# Patient Record
Sex: FEMALE | Race: WHITE | Employment: FULL TIME | URBAN - METROPOLITAN AREA
[De-identification: names, ages, dates, MRNs, and addresses within clinical notes are randomized per-mention and may not be internally consistent; named-entity substitution may affect disease eponyms.]

---

## 2017-11-22 ENCOUNTER — LAB ENCOUNTER (OUTPATIENT)
Dept: LAB | Age: 22
End: 2017-11-22
Attending: PHYSICIAN ASSISTANT
Payer: COMMERCIAL

## 2017-11-22 ENCOUNTER — OFFICE VISIT (OUTPATIENT)
Dept: INTERNAL MEDICINE CLINIC | Facility: CLINIC | Age: 22
End: 2017-11-22

## 2017-11-22 VITALS
OXYGEN SATURATION: 99 % | RESPIRATION RATE: 18 BRPM | DIASTOLIC BLOOD PRESSURE: 60 MMHG | WEIGHT: 137.75 LBS | TEMPERATURE: 99 F | BODY MASS INDEX: 23.23 KG/M2 | HEIGHT: 64.5 IN | SYSTOLIC BLOOD PRESSURE: 98 MMHG | HEART RATE: 98 BPM

## 2017-11-22 DIAGNOSIS — Z11.3 SCREEN FOR STD (SEXUALLY TRANSMITTED DISEASE): ICD-10-CM

## 2017-11-22 DIAGNOSIS — Z00.00 ANNUAL PHYSICAL EXAM: Primary | ICD-10-CM

## 2017-11-22 DIAGNOSIS — Z00.00 ANNUAL PHYSICAL EXAM: ICD-10-CM

## 2017-11-22 DIAGNOSIS — R07.89 CHEST PRESSURE: ICD-10-CM

## 2017-11-22 DIAGNOSIS — F41.9 ANXIETY: ICD-10-CM

## 2017-11-22 DIAGNOSIS — R00.2 PALPITATIONS: ICD-10-CM

## 2017-11-22 PROCEDURE — 99213 OFFICE O/P EST LOW 20 MIN: CPT | Performed by: PHYSICIAN ASSISTANT

## 2017-11-22 PROCEDURE — 80048 BASIC METABOLIC PNL TOTAL CA: CPT | Performed by: PHYSICIAN ASSISTANT

## 2017-11-22 PROCEDURE — 84443 ASSAY THYROID STIM HORMONE: CPT | Performed by: PHYSICIAN ASSISTANT

## 2017-11-22 PROCEDURE — 99395 PREV VISIT EST AGE 18-39: CPT | Performed by: PHYSICIAN ASSISTANT

## 2017-11-22 PROCEDURE — 85025 COMPLETE CBC W/AUTO DIFF WBC: CPT | Performed by: PHYSICIAN ASSISTANT

## 2017-11-22 PROCEDURE — 36415 COLL VENOUS BLD VENIPUNCTURE: CPT | Performed by: PHYSICIAN ASSISTANT

## 2017-11-22 NOTE — PROGRESS NOTES
Feliciano Ho is a 23year old female who presents for a complete physical exam.   HPI:   Pt presents for annual wellness screening. Also here to discuss anxiety. Having periods of chest tightness/pressure, palpitations, SOB for the past few months. 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  : no. Children: no.      REVIEW OF SYSTEMS:   GENERAL: denies fever, chills, night sweats, sig change in weight  SKIN: denies any unusual skin lesions or rashes  EYES: mik discuss MMG recs at age 38. # Cervical CA screening: pap smear at age 24. # Colon CA screening: c-scope at age 48. # Bone density screening: counseled on dietary ca, vit d, regular WB exercise. # Vaccines: pt declines flu shot.   Pt will think about Gar

## 2017-11-22 NOTE — PATIENT INSTRUCTIONS
Please call Delta Torres at [phone] to set up the following tests:  - 48 hour holter monitor    Follow up with Rylan Mor in December after testing done.

## 2017-12-07 NOTE — TELEPHONE ENCOUNTER
Medication(s) to Refill:   Pending Prescriptions Disp Refills    RECLIPSEN 0.15-30 MG-MCG Oral Tab [Pharmacy Med Name: DESO/ETHINYL ESTRADIOL TAB] 84 tablet 3     Sig: TAKE ONE TABLET BY MOUTH ONCE DAILY           Last Time Medication was Filled:      Last

## 2017-12-11 ENCOUNTER — TELEPHONE (OUTPATIENT)
Dept: INTERNAL MEDICINE CLINIC | Facility: CLINIC | Age: 22
End: 2017-12-11

## 2017-12-11 RX ORDER — DESOGESTREL AND ETHINYL ESTRADIOL 0.15-0.03
KIT ORAL
Qty: 84 TABLET | Refills: 3 | Status: SHIPPED | OUTPATIENT
Start: 2017-12-11 | End: 2018-11-02

## 2017-12-11 NOTE — TELEPHONE ENCOUNTER
Patient has been trying to fill since Dec 7, 2017 - she is out of pills today and mother called needed filled asap to [address] open until [address]

## 2018-02-26 ENCOUNTER — LAB SERVICES (OUTPATIENT)
Dept: OTHER | Age: 23
End: 2018-02-26

## 2018-02-26 ENCOUNTER — CHARTING TRANS (OUTPATIENT)
Dept: OTHER | Age: 23
End: 2018-02-26

## 2018-02-26 LAB
MONO TEST: NEGATIVE
RAPID STREP GROUP A: NORMAL

## 2018-05-29 ENCOUNTER — OFFICE VISIT (OUTPATIENT)
Dept: FAMILY MEDICINE CLINIC | Facility: CLINIC | Age: 23
End: 2018-05-29

## 2018-05-29 VITALS
HEIGHT: 64 IN | RESPIRATION RATE: 20 BRPM | SYSTOLIC BLOOD PRESSURE: 94 MMHG | BODY MASS INDEX: 23.39 KG/M2 | WEIGHT: 137 LBS | OXYGEN SATURATION: 100 % | HEART RATE: 98 BPM | DIASTOLIC BLOOD PRESSURE: 62 MMHG | TEMPERATURE: 98 F

## 2018-05-29 DIAGNOSIS — Z11.1 SCREENING FOR TUBERCULOSIS: ICD-10-CM

## 2018-05-29 DIAGNOSIS — Z02.1 ENCOUNTER FOR PRE-EMPLOYMENT EXAMINATION: Primary | ICD-10-CM

## 2018-05-29 PROCEDURE — 86580 TB INTRADERMAL TEST: CPT | Performed by: NURSE PRACTITIONER

## 2018-05-29 PROCEDURE — 99395 PREV VISIT EST AGE 18-39: CPT | Performed by: NURSE PRACTITIONER

## 2018-05-29 NOTE — PROGRESS NOTES
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      Patient also needs LUNGS: denies shortness of breath at rest on on exertion  CARDIOVASCULAR: denies chest pain or palpitations   GI: denies abdominal pain or heartburn.   No N/V/C/D.  MUSCULOSKELETAL: denies back pain or other joint pain  NEURO: denies headaches, history of c Patient Instructions     You will need to return to clinic in 48-72 hours to have results of TB test read. Please return to clinic on 5/31/18 between 10 am and 715pm or on 6/1/18 between 8 am and 10 am to have your TB test read.     If you do not return HIV All women should be tested at least once for HIV between the ages of 15 and 59 At routine exams.  Those with risk factors for HIV should be tested at least annually.    Obesity All women in this age group At routine exams   Syphilis Women at increased r Pneumococcal conjugate vaccine (PCV13) and pneumococcal polysaccharide vaccine (PPSV23) Women at increased risk for infection should talk with their healthcare provider PCV13: 1 dose ages 23 to 72 (protects against 13 types of pneumococcal bacteria)  PPSV2 © 9406-1393 [employer] [address] [facility], [facility] [address] [place]. All rights reserved. This information is not intended as a substitute for professional medical care. Always follow your healthcare professional's instructions.

## 2018-05-29 NOTE — PATIENT INSTRUCTIONS
You will need to return to clinic in 48-72 hours to have results of TB test read. Please return to clinic on 5/31/18 between 10 am and 715pm or on 6/1/18 between 8 am and 10 am to have your TB test read.     If you do not return in this time frame your t once for HIV between the ages of 15 and 59 At routine exams.  Those with risk factors for HIV should be tested at least annually.    Obesity All women in this age group At routine exams   Syphilis Women at increased risk for infection should talk with their PCV13: 1 dose ages 23 to 72 (protects against 13 types of pneumococcal bacteria)  PPSV23: 1 to 2 doses through age 59, or 1 dose at 72 or older (protects against 23 types of pneumococcal bacteria)      Tetanus/diphtheria/pertussis (Td/Tdap) booster All wom

## 2018-05-31 ENCOUNTER — NURSE ONLY (OUTPATIENT)
Dept: FAMILY MEDICINE CLINIC | Facility: CLINIC | Age: 23
End: 2018-05-31

## 2018-05-31 DIAGNOSIS — Z11.1 ENCOUNTER FOR READING OF TUBERCULIN SKIN TEST: Primary | ICD-10-CM

## 2018-05-31 NOTE — PROGRESS NOTES
Patient presents for PPD read. Test placed on 5/29/18    Results: 0.0 mm induration. Letter of results printed and given patient. No further questions or concerns at this time.

## 2018-09-27 ENCOUNTER — OFFICE VISIT (OUTPATIENT)
Dept: FAMILY MEDICINE CLINIC | Facility: CLINIC | Age: 23
End: 2018-09-27

## 2018-09-27 VITALS
BODY MASS INDEX: 25.78 KG/M2 | OXYGEN SATURATION: 99 % | WEIGHT: 151 LBS | SYSTOLIC BLOOD PRESSURE: 102 MMHG | HEART RATE: 73 BPM | RESPIRATION RATE: 20 BRPM | DIASTOLIC BLOOD PRESSURE: 68 MMHG | HEIGHT: 64 IN | TEMPERATURE: 98 F

## 2018-09-27 DIAGNOSIS — J06.9 VIRAL URI: Primary | ICD-10-CM

## 2018-09-27 PROCEDURE — 99213 OFFICE O/P EST LOW 20 MIN: CPT | Performed by: NURSE PRACTITIONER

## 2018-09-27 NOTE — PATIENT INSTRUCTIONS
· Please stop Dayquil, may switch to Tylenol or Motrin for pain/comfort. · Use Breathe Right strips to nose for sleep if stuffy. · Rest, hydrate, take hot steamy showers to help with congestion.    · Salt water gargles (1 teaspoon of salt in 8 oz of warm

## 2018-09-27 NOTE — PROGRESS NOTES
HPI:   Venus Crews is a 26year old female who presents with ill symptoms for  3  days. Patient reports mild body aches, sore throat, congestion, ear pain, sinus pain, OTC cold meds have not been helping, denies fever, denies cough.  Has tried [employer] clear to auscultation all lobes  CARDIO: RRR without murmur  GI: good BS's,no masses, HSM or tenderness    ASSESSMENT AND PLAN:    PLAN:Kanchan was seen today for sinus problem. Diagnoses and all orders for this visit:    Viral URI      Stop Dayquil.  Carolin Pacheco

## 2018-11-01 VITALS
HEIGHT: 64 IN | SYSTOLIC BLOOD PRESSURE: 100 MMHG | TEMPERATURE: 99.2 F | RESPIRATION RATE: 18 BRPM | DIASTOLIC BLOOD PRESSURE: 68 MMHG | HEART RATE: 100 BPM | BODY MASS INDEX: 23.05 KG/M2 | WEIGHT: 135 LBS

## 2018-11-03 NOTE — TELEPHONE ENCOUNTER
Has CPX scheduled with Kaylan Reddy this month.      Refill requested:   Requested Prescriptions     Pending Prescriptions Disp Refills   • Desogestrel-Ethinyl Estradiol (ENSKYCE) 0.15-30 MG-MCG Oral Tab [Pharmacy Med Name: Cesar Izaguirre TAB] 84 tablet 3     Sig: Take

## 2018-11-05 RX ORDER — DESOGESTREL AND ETHINYL ESTRADIOL 0.15-0.03
1 KIT ORAL DAILY
Qty: 28 TABLET | Refills: 0 | Status: SHIPPED | OUTPATIENT
Start: 2018-11-05 | End: 2018-11-23

## 2018-11-23 ENCOUNTER — OFFICE VISIT (OUTPATIENT)
Dept: INTERNAL MEDICINE CLINIC | Facility: CLINIC | Age: 23
End: 2018-11-23

## 2018-11-23 VITALS
TEMPERATURE: 98 F | OXYGEN SATURATION: 99 % | BODY MASS INDEX: 26.31 KG/M2 | RESPIRATION RATE: 12 BRPM | SYSTOLIC BLOOD PRESSURE: 110 MMHG | DIASTOLIC BLOOD PRESSURE: 70 MMHG | HEART RATE: 101 BPM | HEIGHT: 64.75 IN | WEIGHT: 156 LBS

## 2018-11-23 DIAGNOSIS — Z30.41 ENCOUNTER FOR SURVEILLANCE OF CONTRACEPTIVE PILLS: Primary | ICD-10-CM

## 2018-11-23 RX ORDER — DESOGESTREL AND ETHINYL ESTRADIOL 0.15-0.03
1 KIT ORAL DAILY
Qty: 28 TABLET | Refills: 0 | Status: SHIPPED | OUTPATIENT
Start: 2018-11-23 | End: 2018-12-14

## 2018-12-10 ENCOUNTER — LAB ENCOUNTER (OUTPATIENT)
Dept: LAB | Age: 23
End: 2018-12-10
Attending: PHYSICIAN ASSISTANT
Payer: COMMERCIAL

## 2018-12-10 ENCOUNTER — OFFICE VISIT (OUTPATIENT)
Dept: INTERNAL MEDICINE CLINIC | Facility: CLINIC | Age: 23
End: 2018-12-10
Payer: COMMERCIAL

## 2018-12-10 VITALS
HEIGHT: 64.5 IN | HEART RATE: 87 BPM | TEMPERATURE: 98 F | RESPIRATION RATE: 16 BRPM | DIASTOLIC BLOOD PRESSURE: 60 MMHG | WEIGHT: 153.75 LBS | SYSTOLIC BLOOD PRESSURE: 122 MMHG | BODY MASS INDEX: 25.93 KG/M2 | OXYGEN SATURATION: 98 %

## 2018-12-10 DIAGNOSIS — Z11.3 SCREEN FOR STD (SEXUALLY TRANSMITTED DISEASE): ICD-10-CM

## 2018-12-10 DIAGNOSIS — F41.9 ANXIETY: ICD-10-CM

## 2018-12-10 DIAGNOSIS — R00.2 PALPITATIONS: ICD-10-CM

## 2018-12-10 DIAGNOSIS — Z00.00 ANNUAL PHYSICAL EXAM: Primary | ICD-10-CM

## 2018-12-10 DIAGNOSIS — Z00.00 ANNUAL PHYSICAL EXAM: ICD-10-CM

## 2018-12-10 DIAGNOSIS — Z30.41 ENCOUNTER FOR SURVEILLANCE OF CONTRACEPTIVE PILLS: ICD-10-CM

## 2018-12-10 DIAGNOSIS — Z12.4 SCREENING FOR MALIGNANT NEOPLASM OF CERVIX: ICD-10-CM

## 2018-12-10 PROCEDURE — 99395 PREV VISIT EST AGE 18-39: CPT | Performed by: PHYSICIAN ASSISTANT

## 2018-12-10 PROCEDURE — 87591 N.GONORRHOEAE DNA AMP PROB: CPT | Performed by: PHYSICIAN ASSISTANT

## 2018-12-10 PROCEDURE — 88175 CYTOPATH C/V AUTO FLUID REDO: CPT | Performed by: PHYSICIAN ASSISTANT

## 2018-12-10 PROCEDURE — 99214 OFFICE O/P EST MOD 30 MIN: CPT | Performed by: PHYSICIAN ASSISTANT

## 2018-12-10 PROCEDURE — 87491 CHLMYD TRACH DNA AMP PROBE: CPT | Performed by: PHYSICIAN ASSISTANT

## 2018-12-10 PROCEDURE — 84443 ASSAY THYROID STIM HORMONE: CPT

## 2018-12-10 PROCEDURE — 87624 HPV HI-RISK TYP POOLED RSLT: CPT | Performed by: PHYSICIAN ASSISTANT

## 2018-12-10 PROCEDURE — 80048 BASIC METABOLIC PNL TOTAL CA: CPT

## 2018-12-10 PROCEDURE — 36415 COLL VENOUS BLD VENIPUNCTURE: CPT

## 2018-12-10 PROCEDURE — 84439 ASSAY OF FREE THYROXINE: CPT

## 2018-12-10 PROCEDURE — 85025 COMPLETE CBC W/AUTO DIFF WBC: CPT

## 2018-12-10 NOTE — PROGRESS NOTES
Francois Rueda is a 26year old female who presents for a complete physical exam.   HPI:   Pt presents for annual wellness screening. Pt is sexually active, sometimes uses condoms. C/o rapid heart rate over the past 12+ months.   Resting HR in the 70s sweats, sig change in weight  SKIN: denies any unusual skin lesions or rashes  EYES: denies changes in vision  HEENT: denies nasal congestion, drainage, sore throat, ear pain  LUNGS: denies shortness of breath, BELL, wheezing  CARDIOVASCULAR: denies chest p medication or possibly beta blocker if symptoms persist.  # Stress, anxiety: currently manageable. # Contraceptive mgmt: have discussed risks/benefits/alt to OCP use. Pt doing well, wishes to cont.     Health Maint:  # Breast CA screening: discuss MMG rec

## 2018-12-10 NOTE — PATIENT INSTRUCTIONS
Please call Delta Torres at [phone] to set up the following tests:  - 48 hour holter monitor    Please obtain a copy of your last tetanus (TdaP) vaccine. Return for your Gardasil vaccine series (nurse visits).

## 2018-12-14 RX ORDER — DESOGESTREL AND ETHINYL ESTRADIOL 0.15-0.03
1 KIT ORAL DAILY
Qty: 3 PACKAGE | Refills: 3 | Status: SHIPPED | OUTPATIENT
Start: 2018-12-14 | End: 2019-12-09

## 2019-01-14 ENCOUNTER — WALK IN (OUTPATIENT)
Dept: URGENT CARE | Age: 24
End: 2019-01-14

## 2019-01-14 VITALS
DIASTOLIC BLOOD PRESSURE: 70 MMHG | HEART RATE: 110 BPM | TEMPERATURE: 99.6 F | HEIGHT: 64 IN | SYSTOLIC BLOOD PRESSURE: 104 MMHG | BODY MASS INDEX: 24.75 KG/M2 | WEIGHT: 145 LBS | RESPIRATION RATE: 18 BRPM

## 2019-01-14 DIAGNOSIS — J02.9 SORE THROAT: Primary | ICD-10-CM

## 2019-01-14 DIAGNOSIS — J03.90 TONSILLITIS WITH EXUDATE: ICD-10-CM

## 2019-01-14 LAB
INTERNAL PROCEDURAL CONTROLS ACCEPTABLE: YES
S PYO AG THROAT QL IA.RAPID: NEGATIVE

## 2019-01-14 PROCEDURE — 87880 STREP A ASSAY W/OPTIC: CPT | Performed by: NURSE PRACTITIONER

## 2019-01-14 PROCEDURE — 99203 OFFICE O/P NEW LOW 30 MIN: CPT | Performed by: NURSE PRACTITIONER

## 2019-01-14 RX ORDER — DESOGESTREL AND ETHINYL ESTRADIOL 0.15-0.03
1 KIT ORAL
COMMUNITY
Start: 2018-12-14

## 2019-01-14 RX ORDER — AMOXICILLIN 400 MG/5ML
800 POWDER, FOR SUSPENSION ORAL 2 TIMES DAILY
Qty: 200 ML | Refills: 0 | Status: SHIPPED | OUTPATIENT
Start: 2019-01-14

## 2019-01-14 ASSESSMENT — ENCOUNTER SYMPTOMS
HEMATOLOGIC/LYMPHATIC NEGATIVE: 1
SORE THROAT: 1
CONSTITUTIONAL NEGATIVE: 1
HEADACHES: 1
ALLERGIC/IMMUNOLOGIC NEGATIVE: 1
PSYCHIATRIC NEGATIVE: 1
RESPIRATORY NEGATIVE: 1
EYES NEGATIVE: 1
GASTROINTESTINAL NEGATIVE: 1

## 2019-07-11 ENCOUNTER — TELEPHONE (OUTPATIENT)
Dept: INTERNAL MEDICINE CLINIC | Facility: CLINIC | Age: 24
End: 2019-07-11

## 2019-12-10 ENCOUNTER — OFFICE VISIT (OUTPATIENT)
Dept: INTERNAL MEDICINE CLINIC | Facility: CLINIC | Age: 24
End: 2019-12-10
Payer: COMMERCIAL

## 2019-12-10 VITALS
SYSTOLIC BLOOD PRESSURE: 122 MMHG | BODY MASS INDEX: 23.95 KG/M2 | WEIGHT: 142 LBS | HEIGHT: 64.5 IN | TEMPERATURE: 98 F | RESPIRATION RATE: 16 BRPM | HEART RATE: 89 BPM | DIASTOLIC BLOOD PRESSURE: 70 MMHG | OXYGEN SATURATION: 99 %

## 2019-12-10 DIAGNOSIS — Z01.419 WELL WOMAN EXAM: ICD-10-CM

## 2019-12-10 DIAGNOSIS — Z12.4 PAP SMEAR FOR CERVICAL CANCER SCREENING: Primary | ICD-10-CM

## 2019-12-10 PROCEDURE — 87624 HPV HI-RISK TYP POOLED RSLT: CPT | Performed by: NURSE PRACTITIONER

## 2019-12-10 PROCEDURE — 99395 PREV VISIT EST AGE 18-39: CPT | Performed by: NURSE PRACTITIONER

## 2019-12-10 PROCEDURE — 87625 HPV TYPES 16 & 18 ONLY: CPT | Performed by: NURSE PRACTITIONER

## 2019-12-10 RX ORDER — DESOGESTREL AND ETHINYL ESTRADIOL 0.15-0.03
KIT ORAL
Qty: 84 TABLET | Refills: 3 | Status: SHIPPED | OUTPATIENT
Start: 2019-12-10 | End: 2020-11-04

## 2019-12-10 NOTE — TELEPHONE ENCOUNTER
ENSKYCE 0.15-30 MG-MCG Oral Tab    Passed Protocol    Last OV relevant to medication: 12/10/2018  Last refill date: 12/14/2018     #/refills: #3 package w/ 3 refills   When pt was asked to return for OV: 1 year   Upcoming appt/reason: 12/10/2019

## 2019-12-10 NOTE — PROGRESS NOTES
Patient presents with:  Wellness Visit: wwe with pap       HPI:  Pt presents for well woman exam and annual physical. Pt had abnormal pap last year ASC-US. Pt denies new complaints.     HPV Vaccines(1 - Female 3-dose series) due on 08/11/2012  Influenza Vac Use      Smoking status: Never Smoker      Smokeless tobacco: Never Used    Substance and Sexual Activity      Alcohol use: Yes        Comment: very rare      Drug use: No      Sexual activity: Yes    Lifestyle      Physical activity:        Days per week: Denies abdominal pain or cramping. Regular stools. GYNE/GENITOURINARY: Denies dysuria, urgency or frequency; no hx abnormal pap smear; no vaginal discharge; no dyspareunia; periods monthly on OCP; no urinary incontinence.   MUSCULOSKELETAL: Denies arthralg the anterior and posterior cervical chains, submandibular and supraclavicular areas, axilla, and inguinal areas. EXTREMITIES / MUSCULOSKELETAL: 4 extremities with grossly normal ROM. Gait NL. No cyanosis, clubbing or edema.   NEUROLOGIC: CN's II-XII grossl

## 2019-12-23 PROBLEM — R87.610 ASCUS WITH POSITIVE HIGH RISK HPV CERVICAL: Status: ACTIVE | Noted: 2019-12-23

## 2019-12-23 PROBLEM — R87.810 ASCUS WITH POSITIVE HIGH RISK HPV CERVICAL: Status: ACTIVE | Noted: 2019-12-23

## 2020-01-15 PROCEDURE — 88305 TISSUE EXAM BY PATHOLOGIST: CPT | Performed by: OBSTETRICS & GYNECOLOGY

## 2020-01-19 ENCOUNTER — TELEPHONE (OUTPATIENT)
Dept: OBGYN | Age: 25
End: 2020-01-19

## 2020-11-03 NOTE — TELEPHONE ENCOUNTER
LOV: 12/10/2019 with ZULEIMA Davis  RTC: 1 year  Last Relevant Labs: 12/10/2018  Filled: 12/10/2019  #84 with 3 refills    Future Appointments   Date Time Provider Lexii Santana   11/5/2020  2:00 PM JIGNESH Davis [facility] [place]

## 2020-11-04 ENCOUNTER — PATIENT MESSAGE (OUTPATIENT)
Dept: INTERNAL MEDICINE CLINIC | Facility: CLINIC | Age: 25
End: 2020-11-04

## 2020-11-04 RX ORDER — DESOGESTREL AND ETHINYL ESTRADIOL 0.15-0.03
KIT ORAL
Qty: 84 TABLET | Refills: 0 | OUTPATIENT
Start: 2020-11-04

## 2020-11-04 RX ORDER — DESOGESTREL AND ETHINYL ESTRADIOL 0.15-0.03
1 KIT ORAL DAILY
Qty: 1 PACKAGE | Refills: 0 | Status: SHIPPED | OUTPATIENT
Start: 2020-11-04 | End: 2020-12-07

## 2020-11-04 NOTE — TELEPHONE ENCOUNTER
It looks like she sees Gyne for her pap smears etc, so she should get her refill of the OCP's thru them

## 2020-11-04 NOTE — TELEPHONE ENCOUNTER
----- Message from Francheska Santana sent at 9/25/2019  8:07 AM EDT -----  Regarding: Induction  Contact: [phone]  Pt is 38 weeks and wants to have her Induction set up    Please call her with a date etc  Paola said that you set these up? Pt has called twice now         Induction set up for 10/20 for ripening DM Paulo Cross, ok to refill or schedule f/u just for OCP

## 2020-11-11 ENCOUNTER — HOSPITAL ENCOUNTER (OUTPATIENT)
Age: 25
Discharge: HOME | End: 2020-11-11
Payer: COMMERCIAL

## 2020-11-11 VITALS
OXYGEN SATURATION: 99 % | HEART RATE: 99 BPM | DIASTOLIC BLOOD PRESSURE: 71 MMHG | TEMPERATURE: 99 F | RESPIRATION RATE: 20 BRPM | SYSTOLIC BLOOD PRESSURE: 136 MMHG

## 2020-11-11 DIAGNOSIS — R09.89 SYMPTOMS OF UPPER RESPIRATORY INFECTION (URI): ICD-10-CM

## 2020-11-11 DIAGNOSIS — Z20.822 CLOSE EXPOSURE TO COVID-19 VIRUS: Primary | ICD-10-CM

## 2020-11-11 PROCEDURE — 99203 OFFICE O/P NEW LOW 30 MIN: CPT

## 2020-11-11 PROCEDURE — 99213 OFFICE O/P EST LOW 20 MIN: CPT

## 2020-11-11 NOTE — ED PROVIDER NOTES
Patient Seen in: [facility]      History   Patient presents with:  Testing  Myalgias  Cough/URI    Stated Complaint: Sinus Problem - Exposed to Covid in my house am experiencing symptoms.     HPI    Magdalena Gallagher is a 21-year-old female who pre person, place, and time. Patient appears well-developed and well-nourished, non-toxic and in no acute distress. Actively wearing a mask. Head: Normocephalic and atraumatic.    Cardiovascular: Normal rate, regular rhythm, normal heart sounds and intact dis COVID-19 virus  (primary encounter diagnosis)  Symptoms of upper respiratory infection (URI)    Disposition:  Discharge  11/11/2020  5:11 pm    Follow-up:  Juan Carlos Dye MD  [address] [place] [id number]      As needed

## 2020-12-01 ENCOUNTER — MED REC SCAN ONLY (OUTPATIENT)
Dept: INTERNAL MEDICINE CLINIC | Facility: CLINIC | Age: 25
End: 2020-12-01

## 2020-12-05 NOTE — TELEPHONE ENCOUNTER
Passed protocol    Requesting Desogestrel-Ethinyl Estradiol (ENSKYCE) 0.15-30 MG-MCG Oral Tab  LOV: 12/10/20  RTC: 1 year  Last Relevant Labs: 12/10/19  Filled: 11/4/20 #1 pkg with 0 refills    Future Appointments   Date Time Provider Lexii Carrillo

## 2020-12-07 RX ORDER — DESOGESTREL AND ETHINYL ESTRADIOL 0.15-0.03
KIT ORAL
Qty: 28 TABLET | Refills: 0 | Status: SHIPPED | OUTPATIENT
Start: 2020-12-07 | End: 2021-01-04

## 2020-12-07 NOTE — TELEPHONE ENCOUNTER
Medication(s) to Refill:   Requested Prescriptions     Pending Prescriptions Disp Refills   • ENSKYCE 0.15-30 MG-MCG Oral Tab [Pharmacy Med Name: Enskyce 0.15-30 MG-MCG Oral Tablet] 28 tablet 0     Sig: Take 1 tablet by mouth once daily       LOV: 12/10/20

## 2020-12-07 NOTE — TELEPHONE ENCOUNTER
Patient is out of birth control pills and she needs refill today please; she has upcoming visit scheduled.   Shira Sarmiento is not here today please route to Dr Lou  to refill please

## 2020-12-12 ENCOUNTER — OFFICE VISIT (OUTPATIENT)
Dept: INTERNAL MEDICINE CLINIC | Facility: CLINIC | Age: 25
End: 2020-12-12
Payer: COMMERCIAL

## 2020-12-12 VITALS
TEMPERATURE: 99 F | OXYGEN SATURATION: 100 % | DIASTOLIC BLOOD PRESSURE: 70 MMHG | SYSTOLIC BLOOD PRESSURE: 106 MMHG | HEART RATE: 105 BPM | BODY MASS INDEX: 24.35 KG/M2 | HEIGHT: 64.5 IN | RESPIRATION RATE: 16 BRPM | WEIGHT: 144.38 LBS

## 2020-12-12 DIAGNOSIS — Z00.00 ANNUAL PHYSICAL EXAM: Primary | ICD-10-CM

## 2020-12-12 DIAGNOSIS — R87.610 ASCUS WITH POSITIVE HIGH RISK HPV CERVICAL: ICD-10-CM

## 2020-12-12 DIAGNOSIS — R87.810 ASCUS WITH POSITIVE HIGH RISK HPV CERVICAL: ICD-10-CM

## 2020-12-12 PROBLEM — R00.2 PALPITATIONS: Status: RESOLVED | Noted: 2018-12-10 | Resolved: 2020-12-12

## 2020-12-12 PROBLEM — F41.9 ANXIETY: Status: ACTIVE | Noted: 2020-12-12

## 2020-12-12 PROCEDURE — 90471 IMMUNIZATION ADMIN: CPT | Performed by: PHYSICIAN ASSISTANT

## 2020-12-12 PROCEDURE — 90651 9VHPV VACCINE 2/3 DOSE IM: CPT | Performed by: PHYSICIAN ASSISTANT

## 2020-12-12 PROCEDURE — 3078F DIAST BP <80 MM HG: CPT | Performed by: PHYSICIAN ASSISTANT

## 2020-12-12 PROCEDURE — 88175 CYTOPATH C/V AUTO FLUID REDO: CPT | Performed by: PHYSICIAN ASSISTANT

## 2020-12-12 PROCEDURE — 3008F BODY MASS INDEX DOCD: CPT | Performed by: PHYSICIAN ASSISTANT

## 2020-12-12 PROCEDURE — 3074F SYST BP LT 130 MM HG: CPT | Performed by: PHYSICIAN ASSISTANT

## 2020-12-12 PROCEDURE — 90472 IMMUNIZATION ADMIN EACH ADD: CPT | Performed by: PHYSICIAN ASSISTANT

## 2020-12-12 PROCEDURE — 90686 IIV4 VACC NO PRSV 0.5 ML IM: CPT | Performed by: PHYSICIAN ASSISTANT

## 2020-12-12 PROCEDURE — 99395 PREV VISIT EST AGE 18-39: CPT | Performed by: PHYSICIAN ASSISTANT

## 2020-12-12 RX ORDER — NICOTINE POLACRILEX 2 MG
GUM BUCCAL
COMMUNITY

## 2020-12-12 RX ORDER — GARLIC EXTRACT 500 MG
1 CAPSULE ORAL DAILY
COMMUNITY

## 2020-12-12 RX ORDER — MAGNESIUM OXIDE 400 MG (241.3 MG MAGNESIUM) TABLET
1 TABLET NIGHTLY
COMMUNITY

## 2020-12-12 NOTE — PROGRESS NOTES
Miguel Meneses is a 23year old female who presents for a complete physical exam.   HPI:   Pt presents for annual wellness screening. Pap 12/2019 - ASCUS with +HR HPV. Neg colpo in 1/2020. Periods regular on OCP, would like to cont.   Has not been se denies easy bruising or bleeding  LYMPH: denies swollen lymph nodes  ENDOCRINE: denies hot/cold intolerance    EXAM:   /70 (BP Location: Left arm, Patient Position: Sitting, Cuff Size: adult)   Pulse 105   Temp 98.5 °F (36.9 °C) (Oral)   Resp 16   Ht Neg colpo 1/2020 - pap repeated today. If normal will repeat cytology again in 1 year and if normal can resume regular screening interval (q 3 years). # Colon CA screening: c-scope at age 41.   # Bone density screening: counseled on dietary ca, vit d, re

## 2020-12-12 NOTE — PATIENT INSTRUCTIONS
Please return for nurse visits for the following vaccines:  - Gardasil #2 (on or after 2/12/21)  - Gardasil #3 (4 months after your second shot -- June 2021)    Start a calcium and vitamin D supplement (1,000-1,200 mg of calcium and 2,000 IU of vitamin D3)

## 2020-12-21 PROBLEM — R87.612 PAPANICOLAOU SMEAR OF CERVIX WITH LOW GRADE SQUAMOUS INTRAEPITHELIAL LESION (LGSIL): Status: ACTIVE | Noted: 2020-12-21

## 2020-12-29 ENCOUNTER — PATIENT MESSAGE (OUTPATIENT)
Dept: INTERNAL MEDICINE CLINIC | Facility: CLINIC | Age: 25
End: 2020-12-29

## 2021-01-04 RX ORDER — DESOGESTREL AND ETHINYL ESTRADIOL 0.15-0.03
KIT ORAL
Qty: 28 TABLET | Refills: 0 | Status: SHIPPED | OUTPATIENT
Start: 2021-01-04 | End: 2021-01-27

## 2021-01-06 NOTE — TELEPHONE ENCOUNTER
Pediatrician is no longer practicing   Office has been permanently closed   LVM for rep to give office a call back to see how can we obtain theses records   [phone]    Awaiting phone call

## 2021-01-21 NOTE — TELEPHONE ENCOUNTER
Dr. Alex Coronado dropped offf records for pt, the copy consent was given to Dr. Harinder Munoz, a photo ID was provided. Records placed at dr. Freida Silva folder for fup.

## 2021-01-27 ENCOUNTER — TELEPHONE (OUTPATIENT)
Dept: INTERNAL MEDICINE CLINIC | Facility: CLINIC | Age: 26
End: 2021-01-27

## 2021-01-27 PROCEDURE — 88305 TISSUE EXAM BY PATHOLOGIST: CPT | Performed by: OBSTETRICS & GYNECOLOGY

## 2021-01-27 NOTE — TELEPHONE ENCOUNTER
Reviewed childhood immunization record. Please notify pt that she will need updated TdaP this summer -- ok to do when she comes in with Gardasil #2 or #3. Orders for 2nd Gardasil & TdaP placed (has nurse visit scheduled for 2/15/21).

## 2021-01-27 NOTE — TELEPHONE ENCOUNTER
LOV: 12/12/2020 with Efra Steele PA-C  RTC: 1 year  Last Relevant Labs: 12/10/2018   Last pap 12/12/2020   Filled: 1/4/2021   #28 with 0 refills    Future Appointments   Date Time Provider Lexii Santana   1/27/2021  4:30 PM Anyi Solorzano MD

## 2021-01-28 RX ORDER — DESOGESTREL AND ETHINYL ESTRADIOL 0.15-0.03
KIT ORAL
Qty: 3 PACKAGE | Refills: 3 | Status: SHIPPED | OUTPATIENT
Start: 2021-01-28 | End: 2022-01-03

## 2021-02-12 ENCOUNTER — TELEPHONE (OUTPATIENT)
Dept: INTERNAL MEDICINE CLINIC | Facility: CLINIC | Age: 26
End: 2021-02-12

## 2021-02-12 NOTE — TELEPHONE ENCOUNTER
Pt stated she did her 1st gardasil vaccine on 12/12/21 and she just got her covid-19 vaccine yesterday 02/11/21, pt was advised to wait till both covid-19 vaccines were given to her in order to do her 2nd and 3rd gardasil vaccine, pt is asking if is ok for

## 2021-04-07 ENCOUNTER — NURSE ONLY (OUTPATIENT)
Dept: INTERNAL MEDICINE CLINIC | Facility: CLINIC | Age: 26
End: 2021-04-07
Payer: COMMERCIAL

## 2021-04-07 PROCEDURE — 90471 IMMUNIZATION ADMIN: CPT | Performed by: PHYSICIAN ASSISTANT

## 2021-04-07 PROCEDURE — 90651 9VHPV VACCINE 2/3 DOSE IM: CPT | Performed by: PHYSICIAN ASSISTANT

## 2021-04-07 NOTE — PROGRESS NOTES
Patient ok to receive hpv vaccine today per LL. Pt states she would like to wait until she is due for 3rd hpv vaccine to receive TDAP at the same time.

## 2021-06-07 ENCOUNTER — NURSE ONLY (OUTPATIENT)
Dept: INTERNAL MEDICINE CLINIC | Facility: CLINIC | Age: 26
End: 2021-06-07
Payer: COMMERCIAL

## 2021-06-07 ENCOUNTER — TELEPHONE (OUTPATIENT)
Dept: INTERNAL MEDICINE CLINIC | Facility: CLINIC | Age: 26
End: 2021-06-07

## 2021-06-07 PROCEDURE — 90471 IMMUNIZATION ADMIN: CPT | Performed by: INTERNAL MEDICINE

## 2021-06-07 PROCEDURE — 90472 IMMUNIZATION ADMIN EACH ADD: CPT | Performed by: INTERNAL MEDICINE

## 2021-06-07 PROCEDURE — 90715 TDAP VACCINE 7 YRS/> IM: CPT | Performed by: PHYSICIAN ASSISTANT

## 2021-06-07 PROCEDURE — 90651 9VHPV VACCINE 2/3 DOSE IM: CPT | Performed by: INTERNAL MEDICINE

## 2021-06-07 NOTE — TELEPHONE ENCOUNTER
Pt has been seeing me since 2016. Please inform her that she can of course continue to schedule with me but will need to select an IM PCP to have on her chart as I do not work with or under Dr. Sasha Hill.

## 2021-06-07 NOTE — TELEPHONE ENCOUNTER
Order signed, but is primary care physician, Dr. Roxy Koo?      [facility] DO

## 2022-01-03 RX ORDER — DESOGESTREL AND ETHINYL ESTRADIOL 0.15-0.03
KIT ORAL
Qty: 84 TABLET | Refills: 0 | Status: SHIPPED | OUTPATIENT
Start: 2022-01-03

## 2022-01-03 NOTE — TELEPHONE ENCOUNTER
Protocol failed     Requesting: [address] 0.15-30 MG-MCG Oral Tab    LOV: 12/12/20   RTC: 1 yr   Filled: 1/28/21 #3 pkg 3 refills   Recent Labs: 12/12/20     Upcoming OV: none scheduled

## 2022-01-03 NOTE — TELEPHONE ENCOUNTER
Pt called requesting a refill for :    ENSKYCE 0.15-30 MG-MCG Oral Tab    Pt states she moved to [place] and has not been able to find a new primary and would like a refill until she can find a new physician.      [place], [place] - [address]

## 2022-01-04 RX ORDER — DESOGESTREL AND ETHINYL ESTRADIOL 0.15-0.03
1 KIT ORAL DAILY
Refills: 0 | OUTPATIENT
Start: 2022-01-04

## 2022-01-04 NOTE — TELEPHONE ENCOUNTER
Desogestrel-Ethinyl Estradiol (ENSKYCE) 0.15-30 MG-MCG Oral Tab          The original prescription was reordered on 1/3/2022 by JIGNESH Deleon. Renewing this prescription may not be appropriate.      Possible duplicate: Hover to review recent actions on

## 2022-03-15 NOTE — TELEPHONE ENCOUNTER
Pt called requesting refill:  ENSKYCE 0.15-30 MG-MCG Oral Tab    Pt states she moved to [place] and has an appt 4/15 with a new doctor. Pt states she will run out of her medication before the appt.  Pt requesting a short supply sent to       [address], [place],  [place]  [phone]

## 2022-03-16 RX ORDER — DESOGESTREL AND ETHINYL ESTRADIOL 0.15-0.03
1 KIT ORAL DAILY
Qty: 84 TABLET | Refills: 0 | Status: SHIPPED | OUTPATIENT
Start: 2022-03-16

## (undated) NOTE — LETTER
May 31, 2018    [place] [address] [place] [address]      Dear Debby Watters: The following are the results of your recent tests. Please review the list of test results.   Your result is the value on the left; we have also supplied the

## (undated) NOTE — MR AVS SNAPSHOT
After Visit Summary   12/10/2019    Constance Walton    MRN: [id number]           Visit Information     Date & Time  12/10/2019  4:30 PM Provider  [facility] Department  [address] [place], [place] [address]Josef, LUPE HAGAN Dept.  Phone  [phone] non-emergency, consider your options before heading to an ER.          SAME DAY  APPOINTMENTS  Available at primary care offices      [facility]     Treatment for mild  illness or inj